# Patient Record
Sex: MALE | Race: WHITE | Employment: PART TIME | URBAN - METROPOLITAN AREA
[De-identification: names, ages, dates, MRNs, and addresses within clinical notes are randomized per-mention and may not be internally consistent; named-entity substitution may affect disease eponyms.]

---

## 2018-12-16 ENCOUNTER — APPOINTMENT (OUTPATIENT)
Dept: GENERAL RADIOLOGY | Age: 43
End: 2018-12-16
Payer: COMMERCIAL

## 2018-12-16 ENCOUNTER — HOSPITAL ENCOUNTER (EMERGENCY)
Age: 43
Discharge: HOME | End: 2018-12-16
Attending: EMERGENCY MEDICINE
Payer: COMMERCIAL

## 2018-12-16 VITALS
WEIGHT: 170 LBS | OXYGEN SATURATION: 97 % | HEIGHT: 74 IN | TEMPERATURE: 98.6 F | SYSTOLIC BLOOD PRESSURE: 144 MMHG | RESPIRATION RATE: 16 BRPM | HEART RATE: 110 BPM | DIASTOLIC BLOOD PRESSURE: 96 MMHG | BODY MASS INDEX: 21.82 KG/M2

## 2018-12-16 DIAGNOSIS — S82.892A CLOSED FRACTURE OF LEFT ANKLE, INITIAL ENCOUNTER: Primary | ICD-10-CM

## 2018-12-16 PROCEDURE — 99283 EMERGENCY DEPT VISIT LOW MDM: CPT

## 2018-12-16 PROCEDURE — 29515 APPLICATION SHORT LEG SPLINT: CPT

## 2018-12-16 PROCEDURE — 73610 X-RAY EXAM OF ANKLE: CPT

## 2018-12-16 PROCEDURE — 73630 X-RAY EXAM OF FOOT: CPT

## 2018-12-16 ASSESSMENT — PAIN DESCRIPTION - ORIENTATION: ORIENTATION: LEFT

## 2018-12-16 ASSESSMENT — PAIN DESCRIPTION - LOCATION: LOCATION: ANKLE

## 2018-12-16 ASSESSMENT — PAIN DESCRIPTION - DESCRIPTORS: DESCRIPTORS: DISCOMFORT

## 2018-12-16 ASSESSMENT — PAIN SCALES - GENERAL: PAINLEVEL_OUTOF10: 3

## 2018-12-17 ENCOUNTER — TELEPHONE (OUTPATIENT)
Dept: ORTHOPEDIC SURGERY | Age: 43
End: 2018-12-17

## 2018-12-17 NOTE — TELEPHONE ENCOUNTER
Patient was seen in ED for closed fracture of the left ankle. Call transferred to German Angel in the office for scheduling.

## 2018-12-20 ENCOUNTER — OFFICE VISIT (OUTPATIENT)
Dept: ORTHOPEDIC SURGERY | Age: 43
End: 2018-12-20
Payer: COMMERCIAL

## 2018-12-20 VITALS
RESPIRATION RATE: 18 BRPM | TEMPERATURE: 98.4 F | DIASTOLIC BLOOD PRESSURE: 85 MMHG | SYSTOLIC BLOOD PRESSURE: 153 MMHG | HEART RATE: 99 BPM

## 2018-12-20 DIAGNOSIS — M25.572 ACUTE LEFT ANKLE PAIN: Primary | ICD-10-CM

## 2018-12-20 DIAGNOSIS — S82.832A OTHER CLOSED FRACTURE OF DISTAL END OF LEFT FIBULA, INITIAL ENCOUNTER: ICD-10-CM

## 2018-12-20 PROCEDURE — 99203 OFFICE O/P NEW LOW 30 MIN: CPT | Performed by: ORTHOPAEDIC SURGERY

## 2019-01-22 ENCOUNTER — OFFICE VISIT (OUTPATIENT)
Dept: ORTHOPEDIC SURGERY | Age: 44
End: 2019-01-22
Payer: COMMERCIAL

## 2019-01-22 VITALS
HEART RATE: 68 BPM | SYSTOLIC BLOOD PRESSURE: 148 MMHG | DIASTOLIC BLOOD PRESSURE: 80 MMHG | TEMPERATURE: 98.6 F | RESPIRATION RATE: 18 BRPM

## 2019-01-22 DIAGNOSIS — S82.832A OTHER CLOSED FRACTURE OF DISTAL END OF LEFT FIBULA, INITIAL ENCOUNTER: Primary | ICD-10-CM

## 2019-01-22 PROCEDURE — L1902 AFO ANKLE GAUNTLET PRE OTS: HCPCS | Performed by: ORTHOPAEDIC SURGERY

## 2019-01-22 PROCEDURE — 99212 OFFICE O/P EST SF 10 MIN: CPT | Performed by: ORTHOPAEDIC SURGERY

## 2019-02-21 ENCOUNTER — OFFICE VISIT (OUTPATIENT)
Dept: ORTHOPEDIC SURGERY | Age: 44
End: 2019-02-21

## 2019-02-21 VITALS
SYSTOLIC BLOOD PRESSURE: 149 MMHG | DIASTOLIC BLOOD PRESSURE: 86 MMHG | RESPIRATION RATE: 16 BRPM | HEART RATE: 95 BPM | TEMPERATURE: 98.7 F

## 2019-02-21 DIAGNOSIS — S82.832A OTHER CLOSED FRACTURE OF DISTAL END OF LEFT FIBULA, INITIAL ENCOUNTER: Primary | ICD-10-CM

## 2019-02-21 PROCEDURE — 99024 POSTOP FOLLOW-UP VISIT: CPT | Performed by: ORTHOPAEDIC SURGERY
